# Patient Record
Sex: MALE | Race: BLACK OR AFRICAN AMERICAN | NOT HISPANIC OR LATINO | URBAN - METROPOLITAN AREA
[De-identification: names, ages, dates, MRNs, and addresses within clinical notes are randomized per-mention and may not be internally consistent; named-entity substitution may affect disease eponyms.]

---

## 2018-05-11 ENCOUNTER — EMERGENCY (EMERGENCY)
Facility: HOSPITAL | Age: 38
LOS: 0 days | Discharge: HOME | End: 2018-05-11
Attending: EMERGENCY MEDICINE
Payer: COMMERCIAL

## 2018-05-11 VITALS
DIASTOLIC BLOOD PRESSURE: 104 MMHG | HEIGHT: 68 IN | RESPIRATION RATE: 18 BRPM | OXYGEN SATURATION: 100 % | SYSTOLIC BLOOD PRESSURE: 180 MMHG | WEIGHT: 300.05 LBS | TEMPERATURE: 98 F | HEART RATE: 70 BPM

## 2018-05-11 DIAGNOSIS — H53.8 OTHER VISUAL DISTURBANCES: ICD-10-CM
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
## 2018-05-11 DIAGNOSIS — H57.12 OCULAR PAIN, LEFT EYE: ICD-10-CM
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
## 2018-05-11 DIAGNOSIS — S05.8X2A OTHER INJURIES OF LEFT EYE AND ORBIT, INITIAL ENCOUNTER: ICD-10-CM

## 2018-05-11 PROCEDURE — 99284 EMERGENCY DEPT VISIT MOD MDM: CPT | Mod: 25

## 2018-05-11 RX ORDER — MORPHINE SULFATE 50 MG/1
2 CAPSULE, EXTENDED RELEASE ORAL ONCE
Qty: 0 | Refills: 0 | Status: DISCONTINUED | OUTPATIENT
Start: 2018-05-11 | End: 2018-05-11

## 2018-05-11 RX ORDER — DORZOLAMIDE HYDROCHLORIDE TIMOLOL MALEATE 20; 5 MG/ML; MG/ML
1 SOLUTION/ DROPS OPHTHALMIC
Qty: 0 | Refills: 0 | COMMUNITY

## 2018-05-11 RX ADMIN — MORPHINE SULFATE 2 MILLIGRAM(S): 50 CAPSULE, EXTENDED RELEASE ORAL at 06:06

## 2018-05-11 NOTE — ED PROVIDER NOTE - PHYSICAL EXAMINATION
Gen: Alert, moderate distress  Head: NC, AT  Eyes: EOMI, normal lids/conjunctiva, right pupil reactive, left eye with excessive tearing, mishapen and cloudy pupil, no pupillary reactivity, conjunctiva appears normal  ENT: normal hearing, patent oropharynx without erythema/exudate, uvula midline  Neck: +supple, no tenderness/meningismus/JVD, +Trachea midline  Pulm: Bilateral BS, normal resp effort, no wheeze/stridor/retractions  CV: RRR, no M/R/G, +dist pulses  Abd: soft, NT/ND, +BS  Mskel: no edema/erythema/cyanosis  Skin: no rash, warm/dry  Neuro: AAOx3, no sensory/motor deficits, CN 2-12 intact

## 2018-05-11 NOTE — ED PROVIDER NOTE - MEDICAL DECISION MAKING DETAILS
Patient with left eye trauma, h/o corneal transplant.  VSS.  No imaging pursued at this time.  Discussed with Dr. Holbrook, his eye surgeon, requests that patient be sent over to [facility] where he will be expected.  Easiest way to facilitate transfer is by patients private vehicle, family to take patient there, opthalmology resident Dr. Garcia also aware of patient's arrival.  Eye shield provided per recommendation. Patient discharged, family taking patient now.

## 2018-05-11 NOTE — ED ADULT NURSE REASSESSMENT NOTE - NS ED NURSE REASSESS COMMENT FT1
Pt is going to be driven by family to [facility] in [place] to be examined by his physcian that did left cornea implant. d/c by dr. russell. Left ER ambulatory, steady gait. With family

## 2018-05-11 NOTE — ED PROVIDER NOTE - PROGRESS NOTE DETAILS
Called patient's initial ophthalmologist Dr. Clinton Holbrook, message left on answering service. Was attempting transfer to [place] for stat ophthalmology consult, Dr. Sidhu returned call and is trying to facilitate transfer to [facility].  Recommends eye shield, no further interventions or IOP check for now.

## 2018-05-11 NOTE — ED PROVIDER NOTE - OBJECTIVE STATEMENT
Pertinent PMH/PSH/FHx/SHx and Review of Systems contained within:  Patient presents to the ED for acute left eye pain.  Patient says that he was asleep when he hit his head against the bed pole and developed sudden left eye pain.  Has blurred vision with tearing and with only light perception to eye.  Patient is s/p corneal transplant with Dr. Clinton Holbrook 10 years ago.     Relevant PMHx/SHx/SOCHx/FAMH:  Corneal transplants BL    ROS: No fever/chills, No headache, No ear pain/sore throat/dysphagia, No chest pain/palpitations, no SOB/cough/wheeze/stridor, No abdominal pain, No N/V/D/melena, no dysuria/frequency/discharge, No neck/back pain, no rash, no changes in neurological status/function.

## 2018-05-11 NOTE — ED ADULT NURSE NOTE - OBJECTIVE STATEMENT
Pt reports having left cornea implant sx 10 years ago. Pt reports accidently hitting eye about 1 hour ago. Left eye watery drainage noted.